# Patient Record
Sex: MALE | Race: WHITE
[De-identification: names, ages, dates, MRNs, and addresses within clinical notes are randomized per-mention and may not be internally consistent; named-entity substitution may affect disease eponyms.]

---

## 2019-07-02 ENCOUNTER — HOSPITAL ENCOUNTER (EMERGENCY)
Dept: HOSPITAL 38 - CC.ED | Age: 34
Discharge: HOME | End: 2019-07-02
Payer: COMMERCIAL
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
## 2019-07-02 DIAGNOSIS — S91.331A: Primary | ICD-10-CM

## 2019-07-02 DIAGNOSIS — Z23: ICD-10-CM

## 2019-07-02 PROCEDURE — 73630 X-RAY EXAM OF FOOT: CPT

## 2019-07-02 PROCEDURE — 96372 THER/PROPH/DIAG INJ SC/IM: CPT

## 2019-07-02 PROCEDURE — 90471 IMMUNIZATION ADMIN: CPT

## 2019-07-02 PROCEDURE — 99283 EMERGENCY DEPT VISIT LOW MDM: CPT

## 2019-07-02 PROCEDURE — 90715 TDAP VACCINE 7 YRS/> IM: CPT

## 2019-07-02 NOTE — EDM.PDOC
ED HPI GENERAL MEDICAL PROBLEM





- General


Chief Complaint: General


Stated Complaint: "I stepped on a nail"


Time Seen by Provider: 07/02/19 20:55


Source of Information: Reports: Patient


History Limitations: Reports: No Limitations





- History of Present Illness


INITIAL COMMENTS - FREE TEXT/NARRATIVE: 


Jeison is a 34 yo male who presents to the ED via private vehicle with concerns 

of stepping on a nail. States he was wearing rubber soled shoes and it was a 

angel nail. States it went all the way into his foot but didn't come out the 

top. Was able to pull his foot away and the nail was intact. States he was able 

to walk to the house but the pain started to get a lot worse. States he is 

unable to bear weight now and had to use crutches to come into the ED. Unknown 

last Tetanus shot. 





- Related Data


 Allergies











Allergy/AdvReac Type Severity Reaction Status Date / Time


 


No Known Allergies Allergy   Verified 07/02/19 20:49














Past Medical History


Other Gastrointestinal History: Hx of Crohns





- Past Surgical History


GI Surgical History: Reports: Cholecystectomy





Social & Family History





- Tobacco Use


Smoking Status *Q: Never Smoker


Second Hand Smoke Exposure: No





ED ROS GENERAL





- Review of Systems


Review Of Systems: ROS reveals no pertinent complaints other than HPI.


Skin: Reports: Wound





ED EXAM, GENERAL





- Physical Exam


Exam: See Below


Exam Limited By: No Limitations


General Appearance: Alert, No Apparent Distress


Skin Exam: Wound/Incision (puncture wound to plantar and distal aspect of right 

foot between 4th and 5th metatarsals. )





Course





- Orders/Labs/Meds


Orders: 





 Active Orders 24 hr











 Category Date Time Status


 


 Vaccines to be Administered [RC] PER UNIT ROUTINE Care  07/02/19 21:06 Ordered


 


 Foot Comp Min 3V Rt [CR] Stat Exams  07/02/19 21:05 Ordered


 


 Diphth,Pertuss(Acell),Tet Vac [Adacel] Med  07/02/19 21:05 Once





 0.5 ml IM .ONCE ONE   


 


 Lidocaine 1% [Xylocaine 1%] Med  07/02/19 21:05 Once





 20 ml INJECT ONETIME ONE   


 


 cefTRIAXone [Rocephin] Med  07/02/19 21:05 Once





 1 gm IM ONETIME ONE   














Departure





- Departure


Time of Disposition: 21:54


Disposition: Home, Self-Care 01


Clinical Impression: 


Puncture wound of foot without foreign body


Qualifiers:


 Encounter type: initial encounter Laterality: right Qualified Code(s): 

S91.331A - Puncture wound without foreign body, right foot, initial encounter








- Discharge Information


Instructions:  Puncture Wound


Additional Instructions: 


1) Keep foot clean and dry for 48-72 hours, no soaking in tub. 





2) Recommend applying triple antibiotic ointment to area and keep covered with 

bandage





3) Ciprofloxacin 500mg twice a day for 8 days. 





4) Tetanus updated today





5) May bear weight as tolerated. 





6) Tylenol 650-1000mg every 6 hours as needed, not to exceed 4000mg in a 24 hr 

period





7) Hydrocodone 5/325 - 1 tablet daily as needed, remember there is 325mg of 

Tylenol in each pill





8) May walk as tolerated. X-rays showed no fracture. 





9) Follow up if any sign of infection or concerns, may call nurses station as 

well [phone]





- Problem List & Annotations


(1) Puncture wound of foot without foreign body


SNOMED Code(s): 384784143


   Code(s): S91.339A - PUNCTURE WOUND WITHOUT FOREIGN BODY, UNSP FOOT, INIT 

ENCNTR   Status: Acute   


Qualifiers: 


   Encounter type: initial encounter   Laterality: right   Qualified Code(s): 

S91.331A - Puncture wound without foreign body, right foot, initial encounter   





- My Orders


Last 24 Hours: 





My Active Orders





07/02/19 21:05


Foot Comp Min 3V Rt [CR] Stat 


Diphth,Pertuss(Acell),Tet Vac [Adacel]   0.5 ml IM .ONCE ONE 


Lidocaine 1% [Xylocaine 1%]   20 ml INJECT ONETIME ONE 


cefTRIAXone [Rocephin]   1 gm IM ONETIME ONE 





07/02/19 21:06


Vaccines to be Administered [RC] PER UNIT ROUTINE 














- Assessment/Plan


Last 24 Hours: 





My Active Orders





07/02/19 21:05


Foot Comp Min 3V Rt [CR] Stat 


Diphth,Pertuss(Acell),Tet Vac [Adacel]   0.5 ml IM .ONCE ONE 


Lidocaine 1% [Xylocaine 1%]   20 ml INJECT ONETIME ONE 


cefTRIAXone [Rocephin]   1 gm IM ONETIME ONE 





07/02/19 21:06


Vaccines to be Administered [RC] PER UNIT ROUTINE 











Plan: 


 See additional instructions for treatment. X-rays reviewed and no fracture or 

foreign particulate noted.